# Patient Record
Sex: FEMALE | Race: WHITE | URBAN - METROPOLITAN AREA
[De-identification: names, ages, dates, MRNs, and addresses within clinical notes are randomized per-mention and may not be internally consistent; named-entity substitution may affect disease eponyms.]

---

## 2017-06-08 ENCOUNTER — OFFICE VISIT (OUTPATIENT)
Dept: OBGYN CLINIC | Age: 27
End: 2017-06-08

## 2017-06-08 VITALS
DIASTOLIC BLOOD PRESSURE: 76 MMHG | WEIGHT: 167 LBS | HEART RATE: 87 BPM | SYSTOLIC BLOOD PRESSURE: 103 MMHG | HEIGHT: 65 IN | BODY MASS INDEX: 27.82 KG/M2

## 2017-06-08 DIAGNOSIS — Z01.419 WELL WOMAN EXAM: Primary | ICD-10-CM

## 2017-06-08 LAB
BILIRUB UR QL STRIP: NEGATIVE
GLUCOSE UR-MCNC: NEGATIVE MG/DL
HCG URINE, QL. (POC): NEGATIVE
KETONES P FAST UR STRIP-MCNC: NEGATIVE MG/DL
PH UR STRIP: 7 [PH] (ref 4.6–8)
PROT UR QL STRIP: NEGATIVE MG/DL
SP GR UR STRIP: 1.02 (ref 1–1.03)
UA UROBILINOGEN AMB POC: NORMAL (ref 0.2–1)
URINALYSIS CLARITY POC: CLEAR
URINALYSIS COLOR POC: YELLOW
URINE BLOOD POC: NEGATIVE
URINE LEUKOCYTES POC: NEGATIVE
URINE NITRITES POC: NEGATIVE
VALID INTERNAL CONTROL?: YES

## 2017-06-08 RX ORDER — DEXTROAMPHETAMINE SACCHARATE, AMPHETAMINE ASPARTATE, DEXTROAMPHETAMINE SULFATE AND AMPHETAMINE SULFATE 5; 5; 5; 5 MG/1; MG/1; MG/1; MG/1
20 TABLET ORAL
COMMUNITY

## 2017-06-08 NOTE — PROGRESS NOTES
Subjective:   31 y.o. female for annual routine Pap and checkup. Patient's last menstrual period was 2017. Last pap smear:  3/2016 NILM  Menstrual history: regular  Dysmenorrhea intermittently severe  H/o STIs:  CT 2017  Social History: single partner, contraception - IUD. drew due for removal 2017    [unfilled]  OB History    Para Term  AB SAB TAB Ectopic Multiple Living   2    2 1 1   0      # Outcome Date GA Lbr Deni/2nd Weight Sex Delivery Anes PTL Lv   2 TAB            1 SAB                   Pertinent past medical hstory: no history of HTN, DVT, CAD, DM, liver disease, migraines or smoking. There is no problem list on file for this patient. There are no active problems to display for this patient. Current Outpatient Prescriptions   Medication Sig Dispense Refill    dextroamphetamine-amphetamine (ADDERALL) 20 mg tablet Take 20 mg by mouth. No Known Allergies  No past medical history on file. Past Surgical History:   Procedure Laterality Date    HX DILATION AND CURETTAGE      Sab, and elective Ab     History reviewed. No pertinent family history. Social History   Substance Use Topics    Smoking status: Never Smoker    Smokeless tobacco: Not on file    Alcohol use No        ROS:  Feeling well. No dyspnea or chest pain on exertion. No abdominal pain, change in bowel habits, black or bloody stools. No urinary tract symptoms. GYN ROS: normal menses, no pelvic pain or discharge, no breast pain or new or enlarging lumps on self exam. No neurological complaints. Objective:     Visit Vitals    /76    Pulse 87    Ht 5' 5\" (1.651 m)    Wt 167 lb (75.8 kg)    LMP 2017    BMI 27.79 kg/m2     The patient appears well, alert, oriented x 3, in no distress. ENT normal.  Neck supple. No adenopathy or thyromegaly. ALICIA. Lungs are clear, good air entry, no wheezes, rhonchi or rales. S1 and S2 normal, no murmurs, regular rate and rhythm.  Abdomen soft without tenderness, guarding, mass or organomegaly. Extremities show no edema, normal peripheral pulses. Neurological is normal, no focal findings. BREAST EXAM: right breast normal without mass, skin or nipple changes or axillary nodes, left breast normal without mass, skin or nipple changes or axillary nodes    PELVIC EXAM: VULVA: normal appearing vulva with no masses, tenderness or lesions, VAGINA: normal appearing vagina with normal color and discharge, no lesions, CERVIX: normal appearing cervix without discharge or lesions, UTERUS: uterus is normal size, shape, consistency and nontender, ADNEXA: normal adnexa in size, nontender and no masses, PAP: Pap smear done today, DNA probe for chlamydia and GC obtained    Assessment/Plan:   well woman  pap smear  counseled on breast self exam and family planning choices  return annually or prn    ICD-10-CM ICD-9-CM    1. Well woman exam Z01.419 V72.31 PAP IG, CT-NG, RFX HPV QTDF([id number], [id number])      AMB POC URINE PREGNANCY TEST, VISUAL COLOR COMPARISON      AMB POC URINALYSIS DIP STICK MANUAL W/O MICRO   . Discussed with patient that our office will call for abnormal lab results. Normal results can be reviewed through [employer]. If patient has not received a phone call from our office or there are no results found on [employer], the patient has been instructed to call our office to follow up on results. I have verbalized the plan of care with patient and the patient expressed understanding.    All questions were answered

## 2017-06-08 NOTE — LETTER
6/12/2017 4:11 PM 
 
Ms. Haile Click [id number] [address] Dear Mic Click I have reviewed your results and have found the results listed below to be within normal ranges. Pap Smear My recommendations are as follows: 
Repeat pap in three years . Please schedule your next Annual Women's Physical for June 2018. Please call if you have any questions [phone] . Sincerely, Stepan Marin, DO

## 2017-06-08 NOTE — PATIENT INSTRUCTIONS

## 2017-06-10 LAB
C TRACH RRNA CVX QL NAA+PROBE: NEGATIVE
CYTOLOGIST CVX/VAG CYTO: NORMAL
CYTOLOGY CVX/VAG DOC THIN PREP: NORMAL
DX ICD CODE: NORMAL
LABCORP, 190119: NORMAL
Lab: NORMAL
N GONORRHOEA RRNA CVX QL NAA+PROBE: NEGATIVE
OTHER STN SPEC: NORMAL
PATH REPORT.FINAL DX SPEC: NORMAL
STAT OF ADQ CVX/VAG CYTO-IMP: NORMAL

## 2017-06-12 NOTE — PROGRESS NOTES
Pap NILM. Repeat pap in 3 years or as clinically indicated. GENARO Gonsales to send letter to patient with above recommendation.

## 2017-06-13 ENCOUNTER — TELEPHONE (OUTPATIENT)
Dept: OBGYN CLINIC | Age: 27
End: 2017-06-13

## 2023-09-27 ENCOUNTER — COMPREHENSIVE EXAM (OUTPATIENT)
Facility: LOCATION | Age: 33
End: 2023-09-27

## 2023-09-27 DIAGNOSIS — H52.13: ICD-10-CM

## 2023-09-27 DIAGNOSIS — Z46.0: ICD-10-CM

## 2023-09-27 DIAGNOSIS — H52.223: ICD-10-CM

## 2023-09-27 PROCEDURE — 92004 COMPRE OPH EXAM NEW PT 1/>: CPT

## 2023-09-27 PROCEDURE — 92015 DETERMINE REFRACTIVE STATE: CPT

## 2023-09-27 PROCEDURE — 92310-14 CONTACT LENS FITTING - 150

## 2023-09-27 PROCEDURE — 92025KAL KAL CORNEAL TOPOGRAPHY CL, ELECTIVE

## 2023-09-27 ASSESSMENT — KERATOMETRY
OD_AXISANGLE_DEGREES: 118
OS_K2POWER_DIOPTERS: 46.50
OD_K2POWER_DIOPTERS: 46.75
OD_K1POWER_DIOPTERS: 46.25
OS_K1POWER_DIOPTERS: 46.00
OD_AXISANGLE2_DEGREES: 28
OS_AXISANGLE2_DEGREES: 148
OS_AXISANGLE_DEGREES: 058

## 2023-09-27 ASSESSMENT — VISUAL ACUITY
OD_CC: 20/20
OD_CC: 20/20
OU_CC: 20/25
OU_CC: 20/25
OS_CC: 20/20
OU_CC: 20/20
OD_CC: 20/30-1
OS_CC: 20/25-1
OS_CC: 20/25-1
OU_CC: 20/20
OS_CC: 20/20
OD_CC: 20/30

## 2023-09-27 ASSESSMENT — TONOMETRY
OD_IOP_MMHG: 16
OS_IOP_MMHG: 17

## 2025-01-06 ENCOUNTER — HOSPITAL ENCOUNTER (OUTPATIENT)
Facility: HOSPITAL | Age: 35
Setting detail: SPECIMEN
Discharge: HOME | End: 2025-01-09

## 2025-01-06 LAB — HCG SERPL-ACNC: <1 MIU/ML (ref 0–10)

## 2025-01-06 PROCEDURE — 84702 CHORIONIC GONADOTROPIN TEST: CPT
